# Patient Record
Sex: MALE | Race: WHITE
[De-identification: names, ages, dates, MRNs, and addresses within clinical notes are randomized per-mention and may not be internally consistent; named-entity substitution may affect disease eponyms.]

---

## 2019-10-11 ENCOUNTER — HOSPITAL ENCOUNTER (EMERGENCY)
Dept: HOSPITAL 56 - MW.ED | Age: 59
Discharge: HOME | End: 2019-10-11
Payer: SELF-PAY

## 2019-10-11 DIAGNOSIS — K04.7: Primary | ICD-10-CM

## 2019-10-11 DIAGNOSIS — K02.9: ICD-10-CM

## 2019-10-11 PROCEDURE — 99282 EMERGENCY DEPT VISIT SF MDM: CPT

## 2019-10-11 NOTE — EDM.PDOC
ED HPI GENERAL MEDICAL PROBLEM





- General


Chief Complaint: ENT Problem


Stated Complaint: ABCESS


Time Seen by Provider: 10/11/19 19:51





- History of Present Illness


INITIAL COMMENTS - FREE TEXT/NARRATIVE: 





HISTORY AND PHYSICAL:





History of present illness:


The patient is a 59-year-old male who presents with a known history of dental 

disease but who has been putting off getting anything addressed and says that 

one of his teeth in the area of pain is "dead" and says that he has renewed 

pain and swelling to this area and is concerned about infection. The area that 

is hurting him is at the right bicuspid and premolar area and he has no other 

systemic complaints of fever chills sore throat runny nose cough or chest pain.





Review of systems: 


As per history of present illness and below otherwise all systems reviewed and 

negative.





Past medical history: 


As per history of present illness and as reviewed below otherwise 

noncontributory.





Surgical history: 


As per history of present illness and as reviewed below otherwise 

noncontributory.





Social history: 


No reported history of drug or alcohol abuse.





Family history: 


As per history of present illness and as reviewed below otherwise 

noncontributory.





Physical exam:


General: Well-developed well-nourished man who is nontoxic and vital signs are 

noted by me. He has no gross facial swelling appreciated on visual inspection.


HEENT: Atraumatic, normocephalic, pupils reactive, negative for conjunctival 

pallor or scleral icterus, mucous membranes moist, throat clear, neck supple, 

nontender, trachea midline. No cervical adenopathy or nuchal rigidity, there 

are multiple areas of dental decay and disease and there is some mild 

tenderness and swelling of the gum near the right bicuspid and premolar area 

around teeth 6, 5, 4 without fluctuance


Lungs: Clear to auscultation, breath sounds equal bilaterally, chest nontender.


Heart: S1S2, regular rate and rhythm no overt murmurs


Abdomen: Soft, nondistended, nontender. NABS


Pelvis: Deferred


Genitourinary: Deferred.


Rectal: Deferred.


Extremities: Atraumatic, negative for cords or calf pain. Neurovascular 

unremarkable.


Neuro: Awake, alert, oriented. Cranial nerves II through XII unremarkable. 

Cerebellum unremarkable. Motor and sensory unremarkable throughout. Exam 

nonfocal.





Diagnostics:


[]





Therapeutics:


Dental balls





Impression: 


Dental pain/infection





Definitive disposition and diagnosis as appropriate pending reevaluation and 

review of above.


  ** Right Oral/Mouth


Pain Score (Numeric/FACES): 7





- Related Data


 Allergies











Allergy/AdvReac Type Severity Reaction Status Date / Time


 


No Known Allergies Allergy   Verified 10/11/19 19:50











Home Meds: 


 Home Meds





. [No Known Home Meds]  10/11/19 [History]











ED ROS GENERAL





- Review of Systems


Review Of Systems: ROS reveals no pertinent complaints other than HPI.





ED EXAM, GENERAL





- Physical Exam


Exam: See Below (See dictation)





Course





- Vital Signs


Last Recorded V/S: 


 Last Vital Signs











Temp  36.1 C   10/11/19 19:50


 


Pulse  80   10/11/19 19:50


 


Resp  18   10/11/19 19:50


 


BP  154/88 H  10/11/19 19:50


 


Pulse Ox  95   10/11/19 19:50














- Orders/Labs/Meds


Orders: 


 Active Orders 24 hr











 Category Date Time Status


 


 Benzocaine [Hurricaine One 20%] Med  10/11/19 19:56 Once





 2 each MUCMEM ONETIME ONE   


 


 Lidocaine 2% [Xylocaine 2% Viscous] Med  10/11/19 19:56 Once





 15 ml PO ONETIME ONE   














Departure





- Departure


Time of Disposition: 20:00


Disposition: Home, Self-Care 01


Condition: Good


Clinical Impression: 


 Infected dental caries








- Discharge Information


Forms:  ED Department Discharge


Additional Instructions: 


The following information is given to patients seen in the emergency department 

who are being discharged to home. This information is to outline your options 

for follow-up care. We provide all patients seen in our emergency department 

with a follow-up referral.





The need for follow-up, as well as the timing and circumstances, are variable 

depending upon the specifics of your emergency department visit.





If you don't have a primary care physician on staff, we will provide you with a 

referral. We always advise you to contact your personal physician following an 

emergency department visit to inform them of the circumstance of the visit and 

for follow-up with them and/or the need for any referrals to a consulting 

specialist.





The emergency department will also refer you to a specialist when appropriate. 

This referral assures that you have the opportunity for followup care with a 

specialist. All of these measure are taken in an effort to provide you with 

optimal care, which includes your followup.





Under all circumstances we always encourage you to contact your private 

physician who remains a resource for coordinating  your care. When calling for 

followup care, please make the office aware that this follow-up is from your 

recent emergency room visit. If for any reason you are refused follow-up, 

please contact the [facility] emergency 

department at [phone] and ask to speak to the emergency department 

charge nurse.








[facility] 


Primary care- Internal Medicine and Family [address] [address]


[phone]








Ice to face for any swelling and rinse mouth after every time you eat. Use 

dental balls you have been given in the ER as shown for pain management and 

also use over-the-counter pain meds as you choose. Take antibiotics as 

prescribed and please call and schedule follow-up appointment with one of our 

local dentist for definitive care and treatment. Return to ER as needed and as 

discussed





- My Orders


Last 24 Hours: 


My Active Orders





10/11/19 19:56


Benzocaine [Hurricaine One 20%]   2 each MUCMEM ONETIME ONE 


Lidocaine 2% [Xylocaine 2% Viscous]   15 ml PO ONETIME ONE 














- Assessment/Plan


Last 24 Hours: 


My Active Orders





10/11/19 19:56


Benzocaine [Hurricaine One 20%]   2 each MUCMEM ONETIME ONE 


Lidocaine 2% [Xylocaine 2% Viscous]   15 ml PO ONETIME ONE

## 2024-09-26 ENCOUNTER — HOSPITAL ENCOUNTER (EMERGENCY)
Dept: HOSPITAL 56 - MW.ED | Age: 64
Discharge: HOME | End: 2024-09-26
Payer: COMMERCIAL

## 2024-09-26 DIAGNOSIS — Z23: ICD-10-CM

## 2024-09-26 DIAGNOSIS — S61.217A: Primary | ICD-10-CM

## 2024-09-26 DIAGNOSIS — S00.83XA: ICD-10-CM

## 2024-09-26 DIAGNOSIS — Z79.899: ICD-10-CM
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
## 2024-09-26 PROCEDURE — 99284 EMERGENCY DEPT VISIT MOD MDM: CPT

## 2024-09-26 PROCEDURE — 70450 CT HEAD/BRAIN W/O DYE: CPT

## 2024-09-26 PROCEDURE — 90471 IMMUNIZATION ADMIN: CPT

## 2024-09-26 PROCEDURE — 12002 RPR S/N/AX/GEN/TRNK2.6-7.5CM: CPT

## 2024-09-26 PROCEDURE — 90715 TDAP VACCINE 7 YRS/> IM: CPT

## 2024-09-26 PROCEDURE — 73120 X-RAY EXAM OF HAND: CPT

## 2024-09-26 RX ADMIN — TETANUS TOXOID, REDUCED DIPHTHERIA TOXOID AND ACELLULAR PERTUSSIS VACCINE, ADSORBED ONE ML: 5; 2.5; 8; 8; 2.5 SUSPENSION INTRAMUSCULAR at 03:58

## 2024-10-07 ENCOUNTER — HOSPITAL ENCOUNTER (EMERGENCY)
Dept: HOSPITAL 56 - MW.ED | Age: 64
Discharge: LEFT BEFORE DISCHARGE | End: 2024-10-07
Payer: COMMERCIAL

## 2024-10-07 DIAGNOSIS — Z53.21: Primary | ICD-10-CM
